# Patient Record
Sex: FEMALE | Race: WHITE | NOT HISPANIC OR LATINO | URBAN - NONMETROPOLITAN AREA
[De-identification: names, ages, dates, MRNs, and addresses within clinical notes are randomized per-mention and may not be internally consistent; named-entity substitution may affect disease eponyms.]

---

## 2020-02-18 ENCOUNTER — IMPORTED ENCOUNTER (OUTPATIENT)
Dept: URBAN - NONMETROPOLITAN AREA CLINIC 1 | Facility: CLINIC | Age: 55
End: 2020-02-18

## 2020-02-18 PROBLEM — E11.9: Noted: 2020-02-18

## 2020-02-18 PROBLEM — H01.024: Noted: 2020-02-18

## 2020-02-18 PROBLEM — H01.021: Noted: 2020-02-18

## 2020-02-18 PROBLEM — H52.4: Noted: 2020-02-18

## 2020-02-18 PROBLEM — H25.813: Noted: 2020-02-18

## 2020-02-18 PROCEDURE — 92015 DETERMINE REFRACTIVE STATE: CPT

## 2020-02-18 PROCEDURE — 92004 COMPRE OPH EXAM NEW PT 1/>: CPT

## 2020-02-18 NOTE — PATIENT DISCUSSION
Myopia OS  / Astigmatism OU / Hyperopia OD / Presbyopia OU - Discussed diagnosis in detail with patient- New glasses RX given today- Continue to monitor- RTC 1 year complete [place] OU- Discussed diagnosis in detail with patient- Discussed signs and symptoms of progression- Discussed UV protection- No treatment needed at this time - Continue to monitorNIDDM (6-7 years?)- Discussed diagnosis in detail with patient- Stressed importance of good blood sugar control- Recommend no soda’s- Patient reports last A1C was around 7 - Letter to [employer] (GP per patient) - Continue to monitorBlepharitis  OU- Discussed diagnosis in detail with patient- Recommend patient using [employer] J baby shampoo to scrub lid daily- Patient is using a Nataliya Mask - Continue to monitorVerruca OD - Discussed diagnosis in detail with patient- Patient stable at this time - Continue to monitor

## 2020-09-23 ENCOUNTER — IMPORTED ENCOUNTER (OUTPATIENT)
Dept: URBAN - NONMETROPOLITAN AREA CLINIC 1 | Facility: CLINIC | Age: 55
End: 2020-09-23

## 2020-09-23 PROBLEM — H52.4: Noted: 2020-09-23

## 2020-09-23 PROBLEM — H01.021: Noted: 2020-09-23

## 2020-09-23 PROBLEM — H25.813: Noted: 2020-09-23

## 2020-09-23 PROBLEM — E11.9: Noted: 2020-09-23

## 2020-09-23 PROBLEM — H01.024: Noted: 2020-09-23

## 2020-09-23 PROBLEM — H10.423: Noted: 2020-09-23

## 2020-09-23 PROCEDURE — 99213 OFFICE O/P EST LOW 20 MIN: CPT

## 2020-09-23 NOTE — PATIENT DISCUSSION
Allergic Conjunctivitis - Discussed diagnosis in detail w/ patient - Discussed signs and symptoms associated - Hyperemia chemosis granular discharge and calcium deposits noted - Continue oral allergy medications - Start Zylet BID OU samples given in office.  After finished start [place] or Zaditor- Recommend cool compresses - RTC PRN or as scheduled ---------------------------------------------------------------------------------Myopia OS  / Astigmatism OU / Hyperopia OD / Presbyopia OU - Discussed diagnosis in detail with patient- Continue to monitor- RTC 1 year complete [place] OU- Discussed diagnosis in detail with patient- Discussed signs and symptoms of progression- Discussed UV protection- No treatment needed at this time - Continue to monitorNIDDM (6-7 years?)- Discussed diagnosis in detail with patient- Stressed importance of good blood sugar control- Recommend no soda’s- Patient reports last A1C was around 7 - Letter to [employer] (GP per patient) - Continue to monitorBlepharitis  OU- Discussed diagnosis in detail with patient- Recommend patient using J & J baby shampoo to scrub lid daily- Patient is using a Nataliya Mask - Continue to monitorVerruca OD - Discussed diagnosis in detail with patient- Patient stable at this time - Continue to monitor

## 2021-02-19 ENCOUNTER — IMPORTED ENCOUNTER (OUTPATIENT)
Dept: URBAN - NONMETROPOLITAN AREA CLINIC 1 | Facility: CLINIC | Age: 56
End: 2021-02-19

## 2021-02-19 PROBLEM — H01.024: Noted: 2021-02-19

## 2021-02-19 PROBLEM — E11.9: Noted: 2021-02-19

## 2021-02-19 PROBLEM — H01.021: Noted: 2021-02-19

## 2021-02-19 PROBLEM — H52.4: Noted: 2021-02-19

## 2021-02-19 PROBLEM — H25.813: Noted: 2021-02-19

## 2021-02-19 PROCEDURE — 92015 DETERMINE REFRACTIVE STATE: CPT

## 2021-02-19 PROCEDURE — 92014 COMPRE OPH EXAM EST PT 1/>: CPT

## 2021-02-19 NOTE — PATIENT DISCUSSION
Myopia OS  / Astigmatism OU / Hyperopia OD / Presbyopia OU - Discussed diagnosis in detail with patient- New glasses Rx given today - Continue to monitor- RTC 1 year complete [place] OU- Discussed diagnosis in detail with patient- Discussed signs and symptoms of progression- Discussed UV protection- No treatment needed at this time - Continue to monitorNIDDM (6-7 years?)- Discussed diagnosis in detail with patient- Stressed importance of good blood sugar control- Recommend no soda’s- Patient reports last A1C was around 7 - No diabetic retinoapthy seen today - Letter to Anna Rodriguez  (GP per patient) - Continue to monitorBlepharitis  OU- Discussed diagnosis in detail with patient- Recommend patient using J & J baby shampoo to scrub lid daily- Patient is using a Nataliya Mask - Continue to monitorVerruca OD - Discussed diagnosis in detail with patient- Patient stable at this time - Continue to monitor

## 2022-02-23 ENCOUNTER — ESTABLISHED PATIENT (OUTPATIENT)
Dept: URBAN - NONMETROPOLITAN AREA CLINIC 1 | Facility: CLINIC | Age: 57
End: 2022-02-23

## 2022-02-23 DIAGNOSIS — H52.4: ICD-10-CM

## 2022-02-23 PROCEDURE — 92015 DETERMINE REFRACTIVE STATE: CPT

## 2022-02-23 PROCEDURE — 92014 COMPRE OPH EXAM EST PT 1/>: CPT

## 2022-02-23 ASSESSMENT — VISUAL ACUITY
OS_CC: 20/22-
OD_CC: 20/20-

## 2022-02-23 ASSESSMENT — TONOMETRY
OD_IOP_MMHG: 12
OS_IOP_MMHG: 12

## 2022-02-23 NOTE — PATIENT DISCUSSION
Discussed diagnosis in detail with patient. Discussed signs and symptoms of progression. Discussed UV protection. No treatment needed ta this time. Continue to monitor.

## 2022-04-09 ASSESSMENT — VISUAL ACUITY
OD_SC: 20/22-
OD_SC: 20/20-
OD_SC: 20/20-
OS_SC: 20/22
OS_SC: 20/20-
OS_SC: 20/20-

## 2022-04-09 ASSESSMENT — TONOMETRY
OD_IOP_MMHG: 15
OS_IOP_MMHG: 12
OD_IOP_MMHG: 12
OD_IOP_MMHG: 12
OS_IOP_MMHG: 15
OS_IOP_MMHG: 12

## 2024-02-26 ENCOUNTER — ESTABLISHED PATIENT (OUTPATIENT)
Dept: URBAN - NONMETROPOLITAN AREA CLINIC 1 | Facility: CLINIC | Age: 59
End: 2024-02-26

## 2024-02-26 DIAGNOSIS — H52.4: ICD-10-CM

## 2024-02-26 PROCEDURE — 92015 DETERMINE REFRACTIVE STATE: CPT

## 2024-02-26 PROCEDURE — 92014 COMPRE OPH EXAM EST PT 1/>: CPT

## 2024-02-26 ASSESSMENT — TONOMETRY
OD_IOP_MMHG: 11
OS_IOP_MMHG: 11

## 2024-02-26 ASSESSMENT — VISUAL ACUITY
OS_CC: 20/20
OU_CC: 20/20-1
OD_CC: 20/22

## 2025-02-27 ENCOUNTER — COMPREHENSIVE EXAM (OUTPATIENT)
Age: 60
End: 2025-02-27

## 2025-02-27 DIAGNOSIS — H52.4: ICD-10-CM

## 2025-02-27 PROCEDURE — 92015 DETERMINE REFRACTIVE STATE: CPT

## 2025-02-27 PROCEDURE — 92014 COMPRE OPH EXAM EST PT 1/>: CPT
